# Patient Record
Sex: FEMALE | Race: WHITE | NOT HISPANIC OR LATINO | Employment: FULL TIME | URBAN - METROPOLITAN AREA
[De-identification: names, ages, dates, MRNs, and addresses within clinical notes are randomized per-mention and may not be internally consistent; named-entity substitution may affect disease eponyms.]

---

## 2019-01-08 ENCOUNTER — THERAPY VISIT (OUTPATIENT)
Dept: PHYSICAL THERAPY | Facility: CLINIC | Age: 24
End: 2019-01-08
Payer: COMMERCIAL

## 2019-01-08 DIAGNOSIS — M25.551 HIP PAIN, RIGHT: ICD-10-CM

## 2019-01-08 DIAGNOSIS — M25.511 SHOULDER PAIN, RIGHT: Primary | ICD-10-CM

## 2019-01-08 PROCEDURE — 97110 THERAPEUTIC EXERCISES: CPT | Mod: GP | Performed by: PHYSICAL THERAPIST

## 2019-01-08 PROCEDURE — 97161 PT EVAL LOW COMPLEX 20 MIN: CPT | Mod: GP | Performed by: PHYSICAL THERAPIST

## 2019-01-08 PROCEDURE — 97112 NEUROMUSCULAR REEDUCATION: CPT | Mod: GP | Performed by: PHYSICAL THERAPIST
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
## 2019-01-17 ENCOUNTER — THERAPY VISIT (OUTPATIENT)
Dept: PHYSICAL THERAPY | Facility: CLINIC | Age: 24
End: 2019-01-17
Payer: COMMERCIAL

## 2019-01-17 DIAGNOSIS — M25.511 SHOULDER PAIN, RIGHT: Primary | ICD-10-CM

## 2019-01-17 DIAGNOSIS — M53.3 SACROILIAC JOINT PAIN: ICD-10-CM

## 2019-01-17 PROCEDURE — 97112 NEUROMUSCULAR REEDUCATION: CPT | Mod: GP | Performed by: PHYSICAL THERAPIST

## 2019-01-17 PROCEDURE — 97110 THERAPEUTIC EXERCISES: CPT | Mod: GP | Performed by: PHYSICAL THERAPIST

## 2019-02-12 ENCOUNTER — THERAPY VISIT (OUTPATIENT)
Dept: PHYSICAL THERAPY | Facility: CLINIC | Age: 24
End: 2019-02-12
Payer: COMMERCIAL

## 2019-02-12 DIAGNOSIS — M25.551 HIP PAIN, RIGHT: ICD-10-CM

## 2019-02-12 DIAGNOSIS — M25.511 SHOULDER PAIN, RIGHT: Primary | ICD-10-CM

## 2019-02-12 PROCEDURE — 97110 THERAPEUTIC EXERCISES: CPT | Mod: GP | Performed by: PHYSICAL THERAPIST

## 2019-02-12 PROCEDURE — 97112 NEUROMUSCULAR REEDUCATION: CPT | Mod: GP | Performed by: PHYSICAL THERAPIST

## 2019-02-27 ENCOUNTER — THERAPY VISIT (OUTPATIENT)
Dept: PHYSICAL THERAPY | Facility: CLINIC | Age: 24
End: 2019-02-27
Payer: COMMERCIAL

## 2019-02-27 DIAGNOSIS — M25.551 HIP PAIN, RIGHT: ICD-10-CM

## 2019-02-27 DIAGNOSIS — M54.2 NECK PAIN: ICD-10-CM

## 2019-02-27 PROCEDURE — 97110 THERAPEUTIC EXERCISES: CPT | Mod: GP | Performed by: PHYSICAL THERAPIST

## 2019-02-27 PROCEDURE — 97140 MANUAL THERAPY 1/> REGIONS: CPT | Mod: GP | Performed by: PHYSICAL THERAPIST

## 2019-03-08 ENCOUNTER — THERAPY VISIT (OUTPATIENT)
Dept: PHYSICAL THERAPY | Facility: CLINIC | Age: 24
End: 2019-03-08
Payer: COMMERCIAL

## 2019-03-08 DIAGNOSIS — M25.551 HIP PAIN, RIGHT: ICD-10-CM

## 2019-03-08 DIAGNOSIS — M54.2 NECK PAIN: ICD-10-CM

## 2019-03-08 PROCEDURE — 97140 MANUAL THERAPY 1/> REGIONS: CPT | Mod: GP | Performed by: PHYSICAL THERAPIST

## 2019-03-08 PROCEDURE — 97110 THERAPEUTIC EXERCISES: CPT | Mod: GP | Performed by: PHYSICAL THERAPIST

## 2019-03-20 ENCOUNTER — THERAPY VISIT (OUTPATIENT)
Dept: PHYSICAL THERAPY | Facility: CLINIC | Age: 24
End: 2019-03-20
Payer: COMMERCIAL

## 2019-03-20 DIAGNOSIS — M54.2 NECK PAIN: ICD-10-CM

## 2019-03-20 DIAGNOSIS — M25.551 HIP PAIN, RIGHT: ICD-10-CM

## 2019-03-20 PROCEDURE — 97140 MANUAL THERAPY 1/> REGIONS: CPT | Mod: GP | Performed by: PHYSICAL THERAPIST

## 2019-03-20 PROCEDURE — 97110 THERAPEUTIC EXERCISES: CPT | Mod: GP | Performed by: PHYSICAL THERAPIST

## 2019-03-27 ENCOUNTER — THERAPY VISIT (OUTPATIENT)
Dept: PHYSICAL THERAPY | Facility: CLINIC | Age: 24
End: 2019-03-27
Payer: COMMERCIAL

## 2019-03-27 DIAGNOSIS — M54.2 NECK PAIN: ICD-10-CM

## 2019-03-27 DIAGNOSIS — M25.551 HIP PAIN, RIGHT: ICD-10-CM

## 2019-03-27 PROCEDURE — 97140 MANUAL THERAPY 1/> REGIONS: CPT | Mod: GP | Performed by: PHYSICAL THERAPIST

## 2019-03-27 PROCEDURE — 97110 THERAPEUTIC EXERCISES: CPT | Mod: GP | Performed by: PHYSICAL THERAPIST

## 2019-04-03 ENCOUNTER — THERAPY VISIT (OUTPATIENT)
Dept: PHYSICAL THERAPY | Facility: CLINIC | Age: 24
End: 2019-04-03
Payer: COMMERCIAL

## 2019-04-03 DIAGNOSIS — M54.2 NECK PAIN: ICD-10-CM

## 2019-04-03 DIAGNOSIS — M25.551 HIP PAIN, RIGHT: ICD-10-CM

## 2019-04-03 PROCEDURE — 97140 MANUAL THERAPY 1/> REGIONS: CPT | Mod: GP | Performed by: PHYSICAL THERAPY ASSISTANT

## 2019-04-03 PROCEDURE — 97110 THERAPEUTIC EXERCISES: CPT | Mod: GP | Performed by: PHYSICAL THERAPY ASSISTANT

## 2019-04-16 ENCOUNTER — THERAPY VISIT (OUTPATIENT)
Dept: PHYSICAL THERAPY | Facility: CLINIC | Age: 24
End: 2019-04-16
Payer: COMMERCIAL

## 2019-04-16 DIAGNOSIS — M25.551 HIP PAIN, RIGHT: ICD-10-CM

## 2019-04-16 DIAGNOSIS — M54.2 NECK PAIN: ICD-10-CM

## 2019-04-16 PROCEDURE — 97140 MANUAL THERAPY 1/> REGIONS: CPT | Mod: GP | Performed by: PHYSICAL THERAPIST

## 2019-04-24 ENCOUNTER — THERAPY VISIT (OUTPATIENT)
Dept: PHYSICAL THERAPY | Facility: CLINIC | Age: 24
End: 2019-04-24
Payer: COMMERCIAL

## 2019-04-24 DIAGNOSIS — M54.2 NECK PAIN: ICD-10-CM

## 2019-04-24 DIAGNOSIS — M25.551 HIP PAIN, RIGHT: ICD-10-CM

## 2019-04-24 PROCEDURE — 97140 MANUAL THERAPY 1/> REGIONS: CPT | Mod: GP | Performed by: PHYSICAL THERAPIST

## 2019-04-24 PROCEDURE — 97110 THERAPEUTIC EXERCISES: CPT | Mod: GP | Performed by: PHYSICAL THERAPIST

## 2019-05-15 ENCOUNTER — THERAPY VISIT (OUTPATIENT)
Dept: PHYSICAL THERAPY | Facility: CLINIC | Age: 24
End: 2019-05-15
Payer: COMMERCIAL

## 2019-05-15 DIAGNOSIS — M54.2 NECK PAIN: ICD-10-CM

## 2019-05-15 DIAGNOSIS — M25.551 HIP PAIN, RIGHT: ICD-10-CM

## 2019-05-15 PROCEDURE — 97110 THERAPEUTIC EXERCISES: CPT | Mod: GP | Performed by: PHYSICAL THERAPIST

## 2019-05-15 PROCEDURE — 97140 MANUAL THERAPY 1/> REGIONS: CPT | Mod: GP | Performed by: PHYSICAL THERAPIST

## 2019-05-20 ENCOUNTER — THERAPY VISIT (OUTPATIENT)
Dept: PHYSICAL THERAPY | Facility: CLINIC | Age: 24
End: 2019-05-20
Payer: COMMERCIAL

## 2019-05-20 DIAGNOSIS — M54.2 NECK PAIN: ICD-10-CM

## 2019-05-20 DIAGNOSIS — M25.551 HIP PAIN, RIGHT: ICD-10-CM

## 2019-05-20 PROCEDURE — 97140 MANUAL THERAPY 1/> REGIONS: CPT | Mod: GP | Performed by: PHYSICAL THERAPIST

## 2019-05-20 PROCEDURE — 97110 THERAPEUTIC EXERCISES: CPT | Mod: GP | Performed by: PHYSICAL THERAPIST

## 2019-06-10 ENCOUNTER — THERAPY VISIT (OUTPATIENT)
Dept: PHYSICAL THERAPY | Facility: CLINIC | Age: 24
End: 2019-06-10
Payer: COMMERCIAL

## 2019-06-10 DIAGNOSIS — M25.551 HIP PAIN, RIGHT: ICD-10-CM

## 2019-06-10 DIAGNOSIS — M54.2 NECK PAIN: ICD-10-CM

## 2019-06-10 PROCEDURE — 97140 MANUAL THERAPY 1/> REGIONS: CPT | Mod: GP | Performed by: PHYSICAL THERAPIST

## 2019-07-08 ENCOUNTER — THERAPY VISIT (OUTPATIENT)
Dept: PHYSICAL THERAPY | Facility: CLINIC | Age: 24
End: 2019-07-08
Payer: COMMERCIAL

## 2019-07-08 DIAGNOSIS — M25.551 HIP PAIN, RIGHT: ICD-10-CM

## 2019-07-08 DIAGNOSIS — M54.2 NECK PAIN: ICD-10-CM

## 2019-07-08 PROCEDURE — 97140 MANUAL THERAPY 1/> REGIONS: CPT | Mod: GP | Performed by: PHYSICAL THERAPY ASSISTANT

## 2019-07-08 PROCEDURE — 97110 THERAPEUTIC EXERCISES: CPT | Mod: GP | Performed by: PHYSICAL THERAPY ASSISTANT

## 2019-07-19 ENCOUNTER — THERAPY VISIT (OUTPATIENT)
Dept: PHYSICAL THERAPY | Facility: CLINIC | Age: 24
End: 2019-07-19
Payer: COMMERCIAL

## 2019-07-19 DIAGNOSIS — M54.2 NECK PAIN: ICD-10-CM

## 2019-07-19 DIAGNOSIS — M25.551 HIP PAIN, RIGHT: ICD-10-CM

## 2019-07-19 PROCEDURE — 97140 MANUAL THERAPY 1/> REGIONS: CPT | Mod: GP | Performed by: PHYSICAL THERAPIST

## 2019-07-19 PROCEDURE — 97110 THERAPEUTIC EXERCISES: CPT | Mod: GP | Performed by: PHYSICAL THERAPIST

## 2019-07-19 NOTE — PROGRESS NOTES
Subjective:  HPI                    Objective:  System    Physical Exam    General     ROS    Assessment/Plan:    DISCHARGE REPORT    Progress reporting period is to 7/19/2019.       SUBJECTIVE  Subjective changes noted by patient:    Subjective: Shannon is really happy about how much her pain decreased after ablation, the sharp suboccipital pain is gone and she no longer has the muscle pain into her neck.  STill feels somewhat tender right SCM, scalene area.    Changes in function:  Yes (See Goal flowsheet attached for changes in current functional level)  Adverse reaction to treatment or activity: None    OBJECTIVE  Changes noted in objective findings:  Yes,     Objective: Full CROM after doing stretches today.  Mild right SCM and scalene tenderness.  No tenderness in upper trap muscles, cervical parapsinals or suoboccipital muscles.     ASSESSMENT/PLAN  Updated problem list and treatment plan: Diagnosis 1:  Neck pain  Pain -  home program  STG/LTGs have been met or progress has been made towards goals:  Yes (See Goal flow sheet completed today.)  Assessment of Progress: The patient has met all of their long term goals.  Self Management Plans:  Patient is independent in a home treatment program.  I have re-evaluated this patient and find that the nature, scope, duration and intensity of the therapy is appropriate for the medical condition of the patient.  Shannon continues to require the following intervention to meet STG and LTG's:  PT intervention is no longer required to meet STG/LTG.    Recommendations:  This patient is ready to be discharged from therapy and continue their home treatment program.    Please refer to the daily flowsheet for treatment today, total treatment time and time spent performing 1:1 timed codes.

## 2019-10-07 PROBLEM — M25.551 HIP PAIN, RIGHT: Status: RESOLVED | Noted: 2019-02-27 | Resolved: 2019-10-07

## 2019-10-07 PROBLEM — M54.2 NECK PAIN: Status: RESOLVED | Noted: 2019-02-27 | Resolved: 2019-10-07

## 2019-10-07 NOTE — PROGRESS NOTES
DISCHARGE SUMMARY    Shannon Prajapati was seen 3 times for evaluation and treatment.  Patient did not return for further treatment and current status is unknown.  Due to short treatment duration, no objective or functional changes were made.  Please see goal flow sheet from episode noted date below and initial evaluation for further information.  Patient is discharged from therapy and therapy episode is resolved as of 10/07/19.      Linked Episodes   Type: Episode: Status: Noted: Resolved: Last update: Updated by:   PHYSICAL THERAPY R shoulder, R hip 1/8/18 Active 1/8/2019 7/19/2019  8:11 AM Melba Lopez, PT      Comments:

## 2020-07-08 ENCOUNTER — OFFICE VISIT - HEALTHEAST (OUTPATIENT)
Dept: MIDWIFE SERVICES | Facility: CLINIC | Age: 25
End: 2020-07-08

## 2020-07-08 DIAGNOSIS — Z31.9 DESIRE FOR PREGNANCY: ICD-10-CM

## 2020-07-08 DIAGNOSIS — Z30.432 ENCOUNTER FOR REMOVAL OF INTRAUTERINE CONTRACEPTIVE DEVICE (IUD): ICD-10-CM

## 2020-07-08 RX ORDER — PRENATAL VIT/IRON FUM/FOLIC AC 27MG-0.8MG
1 TABLET ORAL DAILY
Qty: 90 TABLET | Refills: 3 | Status: SHIPPED | OUTPATIENT
Start: 2020-07-08

## 2020-07-08 ASSESSMENT — MIFFLIN-ST. JEOR: SCORE: 1407.67

## 2021-06-04 VITALS
WEIGHT: 140 LBS | HEART RATE: 80 BPM | DIASTOLIC BLOOD PRESSURE: 70 MMHG | BODY MASS INDEX: 21.97 KG/M2 | HEIGHT: 67 IN | SYSTOLIC BLOOD PRESSURE: 136 MMHG

## 2021-06-09 NOTE — PROGRESS NOTES
Assessment:     IUD removal well tolerated without complication   Desire for pregnacy       Plan:   Warning signs were reviewed with the patient including bleeding, pain and infection. Discussed preconception health, encouraged PNV for planned pregnancy. Provided pt education materials on preconception health. RTC prn    TT with patient 20 mns >50% time spent in counseling or coordination of care.     Subjective:       Shannon Prajapati is a 24 y.o. female who presents for IUD removal. She is known to me.  Reason for IUD removal: planning pregnancy.. She had a Mirena inserted on 9/5/2018. Reports no complaints since insertion. Bleeding has been light. Minimal cramping, well-controlled with Ibuprofen.     Review of Systems  A 12 point comprehensive review of systems was negative except as noted.      Objective:     PROCEDURE:  A speculum was placed and the IUD strings were visualized.  The IUD strings were grasped with a ring forceps and gentle traction was applied.  The  IUD was easily removed and was noted to be intact. The speculum was then removed.  Mandy Ortega, APRN,CNM

## 2021-06-18 NOTE — PATIENT INSTRUCTIONS - HE
Patient Instructions by Mandy Ortega APRN, CNM at 7/8/2020  4:00 PM     Author: Mandy Ortega APRN, CNM Service: -- Author Type: Midwife    Filed: 7/8/2020  4:25 PM Encounter Date: 7/8/2020 Status: Signed    : Mandy Ortega APRN, CNM (Midwife)       Patient Education   [facility] - Contact information:   Appointment line and to get a hold of CNM in clinic Monday-Friday 8 am - 5 pm: [phone]. There are some clinics with early start times (1st appointment 7:40 am) and others with evening hours (last appointment 6:20 pm). Most are typically open from 8 am to 5 pm.   CNM on call answering service: [phone]. Specify your hospital of choice and leave a brief message for CNM;  will then page CNM who is on call at your specified hospital and you should receive a call back with 15 minutes. Be sure that your ringer is audible and that you can accept blocked calls so that we can get back in touch with you! This number should be reserved for urgent needs if during the day, before 8 am, after 5 pm, weekends, holidays    Preparing for Pregnancy  BIG: Even before you become pregnant, your health matters to your future baby. Adopt good health habits today. And take care of any medical problems you have before becoming pregnant.  Remember: As soon as you know you are pregnant, get regular prenatal care.  Things to consider  Read through the list below. The more items that describe you, the healthier you may be:    I eat a balanced diet.    I keep physically active.    I have my health problems under control.    My weight is about right.    I dont smoke.    I dont use recreational drugs.    I dont have a drinking problem.  Think about the following:    Who will help you through pregnancy and with childcare?    Do you have health insurance?    Do you have the money needed to cover childcare and other day-to-day child expenses?    Will you be able to take the time you  need away from your job for maternity needs and childcare?  Adopt a healthy lifestyle  Each of the following tips can improve your health as you prepare for pregnancy:    Take folic acid 400 to 800 micrograms or a prenatal vitamin daily.     Eat a healthy, well-balanced diet.    Exercise 3 or more times a week and at least 150 minutes weekly.    Get within 15 pounds of your ideal weight.  The first weeks of pregnancy are the most important time in a babys development. Before you become pregnant:    Dont use recreational drugs.    Dont drink alcohol.    Dont smoke.    Get recommended vaccines.  Working with your healthcare provider  Your healthcare provider can help answer any questions you may have. Do you know when to stop taking birth control pills? Are any over-the-counter medicines safe for pregnant women? You can also ask about special care you may need if you have any of the following:    Sexually transmitted diseases (STDs), like herpes or chlamydia    Diabetes    High blood pressure    Other chronic health problems  Date Last Reviewed: 10/1/2017    9570-0122 [employer]. [address] [place] [address], [place] [address]. All rights reserved. This information is not intended as a substitute for professional medical care. Always follow your healthcare professional's instructions.

## 2021-08-15 ENCOUNTER — HEALTH MAINTENANCE LETTER (OUTPATIENT)
Age: 26
End: 2021-08-15

## 2021-10-10 ENCOUNTER — HEALTH MAINTENANCE LETTER (OUTPATIENT)
Age: 26
End: 2021-10-10

## 2021-10-11 ENCOUNTER — THERAPY VISIT (OUTPATIENT)
Dept: PHYSICAL THERAPY | Facility: CLINIC | Age: 26
End: 2021-10-11
Payer: COMMERCIAL

## 2021-10-11 DIAGNOSIS — M54.2 CHRONIC NECK PAIN: Primary | ICD-10-CM

## 2021-10-11 DIAGNOSIS — G89.29 CHRONIC NECK PAIN: Primary | ICD-10-CM

## 2021-10-11 PROCEDURE — 97161 PT EVAL LOW COMPLEX 20 MIN: CPT | Mod: GP | Performed by: PHYSICAL THERAPIST

## 2021-10-11 PROCEDURE — 97110 THERAPEUTIC EXERCISES: CPT | Mod: GP | Performed by: PHYSICAL THERAPIST
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
## 2021-10-19 ENCOUNTER — THERAPY VISIT (OUTPATIENT)
Dept: PHYSICAL THERAPY | Facility: CLINIC | Age: 26
End: 2021-10-19
Payer: COMMERCIAL

## 2021-10-19 DIAGNOSIS — M54.2 CHRONIC NECK PAIN: Primary | ICD-10-CM

## 2021-10-19 DIAGNOSIS — G89.29 CHRONIC NECK PAIN: Primary | ICD-10-CM

## 2021-10-19 PROCEDURE — 97110 THERAPEUTIC EXERCISES: CPT | Mod: GP | Performed by: PHYSICAL THERAPIST

## 2021-10-19 PROCEDURE — 97112 NEUROMUSCULAR REEDUCATION: CPT | Mod: GP | Performed by: PHYSICAL THERAPIST

## 2021-10-25 ENCOUNTER — THERAPY VISIT (OUTPATIENT)
Dept: PHYSICAL THERAPY | Facility: CLINIC | Age: 26
End: 2021-10-25
Payer: COMMERCIAL

## 2021-10-25 DIAGNOSIS — M54.2 CHRONIC NECK PAIN: Primary | ICD-10-CM

## 2021-10-25 DIAGNOSIS — G89.29 CHRONIC NECK PAIN: Primary | ICD-10-CM

## 2021-10-25 PROCEDURE — 97110 THERAPEUTIC EXERCISES: CPT | Mod: GP | Performed by: PHYSICAL THERAPIST

## 2022-09-18 ENCOUNTER — HEALTH MAINTENANCE LETTER (OUTPATIENT)
Age: 27
End: 2022-09-18

## 2023-10-08 ENCOUNTER — HEALTH MAINTENANCE LETTER (OUTPATIENT)
Age: 28
End: 2023-10-08